# Patient Record
(demographics unavailable — no encounter records)

---

## 2017-02-13 RX ORDER — NORGESTIMATE-ETHINYL ESTRADIOL 7DAYSX3 28
TABLET ORAL
Qty: 84 TABLET | Refills: 3 | Status: SHIPPED | OUTPATIENT
Start: 2017-02-13

## 2017-05-23 DIAGNOSIS — B37.3 VAGINAL CANDIDA: ICD-10-CM

## 2017-05-23 RX ORDER — FLUCONAZOLE 150 MG/1
TABLET ORAL
Qty: 2 TABLET | Refills: 0 | OUTPATIENT
Start: 2017-05-23

## 2017-05-23 NOTE — TELEPHONE ENCOUNTER
From: Rebeca Mccoy  To: BRANT Fulton  Sent: 5/23/2017 5:31 PM CDT  Subject: Medication Renewal Request    Original authorizing provider: BRANT Israel    [facility] would like a refill of the following medications:  fl